# Patient Record
Sex: MALE | Race: WHITE | NOT HISPANIC OR LATINO | URBAN - METROPOLITAN AREA
[De-identification: names, ages, dates, MRNs, and addresses within clinical notes are randomized per-mention and may not be internally consistent; named-entity substitution may affect disease eponyms.]

---

## 2020-04-10 ENCOUNTER — TRANSFERRED RECORDS (OUTPATIENT)
Dept: HEALTH INFORMATION MANAGEMENT | Facility: CLINIC | Age: 35
End: 2020-04-10

## 2020-04-29 ENCOUNTER — MEDICAL CORRESPONDENCE (OUTPATIENT)
Dept: HEALTH INFORMATION MANAGEMENT | Facility: CLINIC | Age: 35
End: 2020-04-29

## 2020-04-30 ENCOUNTER — TRANSCRIBE ORDERS (OUTPATIENT)
Dept: OTHER | Age: 35
End: 2020-04-30

## 2020-04-30 DIAGNOSIS — K62.5 BRIGHT RED BLOOD PER RECTUM: Primary | ICD-10-CM

## 2020-05-01 ENCOUNTER — TELEPHONE (OUTPATIENT)
Dept: SURGERY | Facility: CLINIC | Age: 35
End: 2020-05-01

## 2020-05-01 ENCOUNTER — PATIENT OUTREACH (OUTPATIENT)
Dept: SURGERY | Facility: CLINIC | Age: 35
End: 2020-05-01

## 2020-05-01 NOTE — PROGRESS NOTES
I called pt to ask him what type of doctor he was looking for because the referral stated Gastroenterologist. Pt stated he wanted to see a gastroenterologist. I told him I would send the referral to the correct department.

## 2020-05-01 NOTE — TELEPHONE ENCOUNTER
[facility] Call Center    Phone Message    May a detailed message be left on voicemail: yes     Reason for Call: Other: Pt being referred for Bright red blood per rectum by Dr. Fei Duvall at [facility]. Records in Care everywhere with referral.      Action Taken: Message routed to:  [facility]    Travel Screening: Not Applicable

## 2020-05-05 NOTE — TELEPHONE ENCOUNTER
RECORDS RECEIVED FROM: Internal    DATE RECEIVED: 5/7/20 10AM   NOTES STATUS DETAILS   OFFICE NOTE from referring provider Internal Referral 5/1/20  Referral letter 4/29/20   [facility] Internal      REFERRAL INFORMATION    Date referral was placed: 5/7/20   Date all records received:    Date records were scanned into EPIC:    Date records were sent to Provider to review:    Date and recommendation received from provider:  LETTER SENT  SCHEDULE APPOINTMENT   Date patient was contacted to schedule: 5/4/20

## 2020-05-07 ENCOUNTER — VIRTUAL VISIT (OUTPATIENT)
Dept: GASTROENTEROLOGY | Facility: CLINIC | Age: 35
End: 2020-05-07
Payer: MEDICARE

## 2020-05-07 ENCOUNTER — PRE VISIT (OUTPATIENT)
Dept: GASTROENTEROLOGY | Facility: CLINIC | Age: 35
End: 2020-05-07

## 2020-05-07 DIAGNOSIS — K92.1 HEMATOCHEZIA: Primary | ICD-10-CM

## 2020-05-07 RX ORDER — BUPROPION HYDROCHLORIDE 150 MG/1
TABLET ORAL
COMMUNITY
Start: 2020-04-14

## 2020-05-07 NOTE — NURSING NOTE
Chief Complaint   Patient presents with     Consult     New consult rectal bleeding       There were no vitals filed for this visit.    There is no height or weight on file to calculate BMI.    Lenora Mon, CMA

## 2020-05-07 NOTE — PROGRESS NOTES
"Dwaine Vanegas is a 35 year old male who is being evaluated via a billable telephone visit.      The patient has been notified of following:     \"This telephone visit will be conducted via a call between you and your physician/provider. We have found that certain health care needs can be provided without the need for a physical exam.  This service lets us provide the care you need with a short phone conversation.  If a prescription is necessary we can send it directly to your pharmacy.  If lab work is needed we can place an order for that and you can then stop by our lab to have the test done at a later time.    Telephone visits are billed at different rates depending on your insurance coverage. During this emergency period, for some insurers they may be billed the same as an in-person visit.  Please reach out to your insurance provider with any questions.    If during the course of the call the physician/provider feels a telephone visit is not appropriate, you will not be charged for this service.\"    Patient has given verbal consent for Telephone visit?  Yes    What phone number would you like to be contacted at? [phone]    How would you like to obtain your AVS? Mail a copy    Phone call duration: 30 minutes. The spouse of the patient was on the phone.    Main Symptom    Blood per rectum    History of Presenting Ilness    The patient is referred for hematochezia by Dr. Duvall.    Patient is a 35-year-old man with traumatic brain injury 5 years ago due to an accident. He was on a bowel program with laxatives and had BMs about once every 4-5 days. Since 6 months he has done better and has a BM once every 3 days.    About 4 months ago he noted blood on top of the stool about once per week. The amount of blood is small, enough to color the water red, but not a cup and no clots. He has never had a rectal exam as far as he knows. He believes he has hemorrhoids. A CBC in 2018 was normal.    His BMs are difficult - he " has to bear down. He has some perirectal pain with this. He was not on a stool softener but on Senna.    No Known Allergies       Current Outpatient Medications:      buPROPion (WELLBUTRIN XL) 150 MG 24 hr tablet, Take 1 tab po qAM x 2 wks then increase to 2 tabs po qAM, Disp: , Rfl:      No past medical history on file.     No past surgical history on file.     Physical Exam     Not possible - telephone visit.    Impression    Benign intermittent anal blood loss with blood on top of stool; bearing down to get BMs. Pain around rectum. Most likely source is anal fissure or hemorrhoids.  Traumatic brain injury    Plan    I proposed a combined diagnostic and therapeutic approach.    1. Diagnostically patient needs a mark-anal inspection, rectal exam and flexible sigmoidoscopy.    2. Therapeutically, I suggested softer bowel movements. A natural way would be to use prunes (dried, soak overnight, eat with cereal) between 5-10 per day, use of 1-2 whole oranges per day. And/or metamucil 1-3 doses per day with a lot of water or juice. He can titrate using this combination the consistency of his BMs.    orders and followup;    No orders of the defined types were placed in this encounter.     Patient and wife will contact Dr. Ocampo to set up the mark-anal inspection, rectal exam and flexible sigmoidoscopy. They prefer somewhere close to their home.    Discussed in detail, answered all questions. Patient willing to proceed as outlined.      Vincent Blum M.D.

## 2023-03-21 ENCOUNTER — MEDICAL CORRESPONDENCE (OUTPATIENT)
Dept: HEALTH INFORMATION MANAGEMENT | Facility: CLINIC | Age: 38
End: 2023-03-21
Payer: MEDICARE

## 2023-03-24 ENCOUNTER — TRANSCRIBE ORDERS (OUTPATIENT)
Dept: OTHER | Age: 38
End: 2023-03-24

## 2023-03-24 DIAGNOSIS — S06.9X6S TRAUMATIC BRAIN INJURY, WITH LOSS OF CONSCIOUSNESS GREATER THAN 24 HOURS WITHOUT RETURN TO PRE-EXISTING CONSCIOUS LEVEL WITH PATIENT SURVIVING, SEQUELA (H): Primary | ICD-10-CM

## 2023-03-24 DIAGNOSIS — H53.8 BLURRING OF VISUAL IMAGE: ICD-10-CM

## 2023-06-01 ENCOUNTER — TELEPHONE (OUTPATIENT)
Dept: OPHTHALMOLOGY | Facility: CLINIC | Age: 38
End: 2023-06-01
Payer: MEDICARE

## 2023-06-01 NOTE — TELEPHONE ENCOUNTER
Ok to schedule with Dr. Odom or Dr. Lee in TBI (traumatic brain injury) clinic per referral of traumatic brain injury history    Note to patient communicator to assist in scheduling and may direct back to triage if applicable after call.    Pablo Mccauley RN 10:34 AM 06/01/23

## 2023-06-01 NOTE — TELEPHONE ENCOUNTER
[facility] Call Center    Phone Message    May a detailed message be left on voicemail: yes     Reason for Call: Appointment Intake    Referring Provider Name: Dr. Eamon Bal at [facility] referring Pt to Dr. Will  Diagnosis and/or Symptoms: Traumatic brain injury, with loss of consciousness greater than 24 hours without return to pre-existing conscious level with patient surviving, sequela (H)  Blurring of visual image    Writer didn't schedule Pt because the referral was for a Dx that isn't under neuro-ophth with Dr. Will. Dx is under [facility] TBI with either Vilma or Jesus. Please call Pt's wife Yue back to schedule Pt. Thank you!    Action Taken: Message routed to:  [facility] Ophthalmology    Travel Screening: Not Applicable

## 2023-06-01 NOTE — TELEPHONE ENCOUNTER
Called and spoke to wife     Made an appointment for 6/5 @ 330 pm with Dr. Esparza     Provided the clinic address     Lorri Beasley Communication Facilitator on 6/1/2023 at 12:22 PM

## 2023-06-05 ENCOUNTER — OFFICE VISIT (OUTPATIENT)
Dept: OPHTHALMOLOGY | Facility: CLINIC | Age: 38
End: 2023-06-05
Payer: MEDICARE

## 2023-06-05 DIAGNOSIS — H52.4 PRESBYOPIA: Primary | ICD-10-CM

## 2023-06-05 PROCEDURE — 92004 COMPRE OPH EXAM NEW PT 1/>: CPT

## 2023-06-05 PROCEDURE — 92015 DETERMINE REFRACTIVE STATE: CPT

## 2023-06-05 RX ORDER — BACLOFEN 10 MG/1
TABLET ORAL
COMMUNITY
Start: 2023-05-17

## 2023-06-05 ASSESSMENT — REFRACTION_MANIFEST
OS_ADD: +1.00
OD_CYLINDER: SPHERE
OS_CYLINDER: SPHERE
OD_ADD: +1.00
OS_SPHERE: -0.50
OD_SPHERE: -0.50

## 2023-06-05 ASSESSMENT — VISUAL ACUITY
OD_SC: 20/20
OS_SC: 20/20
METHOD: SNELLEN - LINEAR

## 2023-06-05 ASSESSMENT — TONOMETRY
IOP_METHOD: ICARE
OS_IOP_MMHG: 22
OD_IOP_MMHG: 22

## 2023-06-05 ASSESSMENT — SLIT LAMP EXAM - LIDS
COMMENTS: 2+ SCURF
COMMENTS: 2+ SCURF

## 2023-06-05 ASSESSMENT — CONF VISUAL FIELD
OS_INFERIOR_NASAL_RESTRICTION: 0
OS_INFERIOR_TEMPORAL_RESTRICTION: 0
OD_INFERIOR_TEMPORAL_RESTRICTION: 0
OD_NORMAL: 1
OD_SUPERIOR_TEMPORAL_RESTRICTION: 0
OD_INFERIOR_NASAL_RESTRICTION: 0
OS_NORMAL: 1
OS_SUPERIOR_TEMPORAL_RESTRICTION: 0
OD_SUPERIOR_NASAL_RESTRICTION: 0
OS_SUPERIOR_NASAL_RESTRICTION: 0

## 2023-06-05 ASSESSMENT — EXTERNAL EXAM - RIGHT EYE: OD_EXAM: NORMAL

## 2023-06-05 ASSESSMENT — EXTERNAL EXAM - LEFT EYE: OS_EXAM: NORMAL

## 2023-06-05 ASSESSMENT — CUP TO DISC RATIO
OD_RATIO: 0.3
OS_RATIO: 0.3

## 2023-06-05 NOTE — PROGRESS NOTES
"HPI:    Patient had horseback riding accident in 2015; hospitalized for 5 months.    In the last few years, he reports his vision blurs for about 5-10 seconds in the right eye only with prolonged near work.  Feels like an eye spasm, things \"come in and out of focus.\"  Not cleared with blinking and otherwise denies EFRAIN symptoms.  Denies associated headache.  Feels his current glasses make it worse.    Following his accident, he had intermittent double vision.  This has been resolved for years.  Left XT documented on 2019 exam--asymptomatic at that time.    Assessment and Plan:  1) Accommodative insufficiency/presbyopia  A: Markedly reduced PRA today.   P: Patient and wife educated on condition.  Responded well to +1.00 add in trial frame.  Explained that history of TBI is likely etiology of earlier onset presbyopia.  Recommended low +0.50 OTC readers prn.  Monitor annually.    Initiate Ocusoft lid scrubs.  Discussed EFRAIN and possible contribution to intermittent blur.    Complete documentation of historical and exam elements from today's encounter can be found in the full encounter summary report (not reduplicated in this progress note). I personally obtained the chief complaint(s) and history of present illness. I confirmed and edited as necessary the review of systems, past medical/surgical history, family history, social history, and examination findings as document by others; and I examined the patient myself. I personally reviewed the relevant tests, images, and reports as documented above. I formulated and edited as necessary the assessment and plan and discussed the findings and management plan with the patient and family.    Heidi Esparza, OD    "

## 2023-06-05 NOTE — NURSING NOTE
Chief Complaints and History of Present Illnesses   Patient presents with     Tramatic Brain injury     Patient present due to Traumatic brain injury, with loss of consciousness greater than 24 hours without return to pre-existing conscious level with patient surviving, sequela      Chief Complaint(s) and History of Present Illness(es)     Tramatic Brain injury            Comments: Patient present due to Traumatic brain injury, with loss of consciousness greater than 24 hours without return to pre-existing conscious level with patient surviving, sequela           Comments    Patient present following a horseback riding accident 2015. Patient was in the hospital for about 5 months. Patient had to live in nursing home and do rehab for 8 months after that. Patient denies headaches. Patient states blurriness when reading. Patient states eye spasms. Patient states right eye vision gets blurry every often. Patient states diplopia happens very rarely.   Estefany Bernal, COA June 5, 2023 3:50 PM

## 2023-08-05 ENCOUNTER — HEALTH MAINTENANCE LETTER (OUTPATIENT)
Age: 38
End: 2023-08-05

## 2024-09-22 ENCOUNTER — HEALTH MAINTENANCE LETTER (OUTPATIENT)
Age: 39
End: 2024-09-22